# Patient Record
Sex: FEMALE | Race: BLACK OR AFRICAN AMERICAN | NOT HISPANIC OR LATINO
[De-identification: names, ages, dates, MRNs, and addresses within clinical notes are randomized per-mention and may not be internally consistent; named-entity substitution may affect disease eponyms.]

---

## 2019-06-23 PROBLEM — Z00.00 ENCOUNTER FOR PREVENTIVE HEALTH EXAMINATION: Status: ACTIVE | Noted: 2019-06-23

## 2019-07-23 ENCOUNTER — APPOINTMENT (OUTPATIENT)
Dept: NEUROLOGY | Facility: HOSPITAL | Age: 64
End: 2019-07-23

## 2019-07-23 ENCOUNTER — OUTPATIENT (OUTPATIENT)
Dept: OUTPATIENT SERVICES | Facility: HOSPITAL | Age: 64
LOS: 1 days | End: 2019-07-23
Payer: MEDICARE

## 2019-07-23 VITALS
SYSTOLIC BLOOD PRESSURE: 129 MMHG | BODY MASS INDEX: 35.44 KG/M2 | HEART RATE: 91 BPM | DIASTOLIC BLOOD PRESSURE: 87 MMHG | HEIGHT: 63 IN | WEIGHT: 200 LBS

## 2019-07-23 VITALS
DIASTOLIC BLOOD PRESSURE: 76 MMHG | HEART RATE: 66 BPM | HEIGHT: 63 IN | SYSTOLIC BLOOD PRESSURE: 134 MMHG | BODY MASS INDEX: 35.26 KG/M2 | WEIGHT: 199 LBS

## 2019-07-23 DIAGNOSIS — Z86.79 PERSONAL HISTORY OF OTHER DISEASES OF THE CIRCULATORY SYSTEM: ICD-10-CM

## 2019-07-23 DIAGNOSIS — Z86.39 PERSONAL HISTORY OF OTHER ENDOCRINE, NUTRITIONAL AND METABOLIC DISEASE: ICD-10-CM

## 2019-07-23 DIAGNOSIS — I63.9 CEREBRAL INFARCTION, UNSPECIFIED: ICD-10-CM

## 2019-07-23 DIAGNOSIS — R51 HEADACHE: ICD-10-CM

## 2019-07-23 PROCEDURE — G0463: CPT

## 2019-07-23 NOTE — HISTORY OF PRESENT ILLNESS
[FreeTextEntry1] : Pt is a 64 year old lady with a pmhx of stroke 3 years ago, ?takaysu, CAD, HTN and HLD. She is here to establish care due to her stroke in the past. 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 Pt reports on the day of the stroke 3 years ago [place] and was looking at her ipad when she felt like she couldn’t move her body. Pt was treated at [facility] in [place]. Since has been living at the [facility] in [place]. Stroke type, prior imaging and labs unknown at this point. \par \par Home medications: ASA 81mg, amlodipine, atorvastatin 10mg, cyanocobalamin and labetalol. Since her stroke she has not had any further neurological issues. \par \par ROS - denies HA, dizziness, new weakness/numbness, new changes in speech.

## 2019-07-23 NOTE — PHYSICAL EXAM
[FreeTextEntry1] : MS - AAO to person, month and year but not place, speech is fluent, naming and repitition is in tact.\par CNs - EOMI, face is symmetric when asked to smile by assymetric due to poor dentition at rest, sensation in tact b/l\par Motor - Right UE with drift 4/5, 5/5 elsewhere\par Sensory - light touch in tact throughout\par Coordination - FNF abnormal on right due to subtle weakness but normal on the left\par Gait - pt is able to stand unassisted, she walks with a cane/walker. \par

## 2019-07-23 NOTE — DISCUSSION/SUMMARY
[FreeTextEntry1] : Pt is a 64 year old lady with a pmhx of stroke 3 years ago, ?takaysu, CAD, HTN and HLD. She is here to establish care due to her stroke in the past. 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 Her neurological exam is remarkable for some very subtle perseveration however fluent speech for the most part. Also she is 4/5 in her RUE, otherwise 5/5 elsewhere she also has problems walking and balancing. \par \par Plan:\par We will need to obtain prior records regarding her stroke and treatment at time.\par She should continue ASA 81mg daily\par Cardiology referral\par RTC after records obtained and cardiology appointment.

## 2019-10-15 ENCOUNTER — APPOINTMENT (OUTPATIENT)
Dept: NEUROLOGY | Facility: HOSPITAL | Age: 64
End: 2019-10-15

## 2019-10-15 ENCOUNTER — OUTPATIENT (OUTPATIENT)
Dept: OUTPATIENT SERVICES | Facility: HOSPITAL | Age: 64
LOS: 1 days | End: 2019-10-15
Payer: MEDICARE

## 2019-10-15 VITALS
SYSTOLIC BLOOD PRESSURE: 127 MMHG | BODY MASS INDEX: 35.44 KG/M2 | RESPIRATION RATE: 14 BRPM | WEIGHT: 200 LBS | DIASTOLIC BLOOD PRESSURE: 76 MMHG | HEART RATE: 62 BPM | HEIGHT: 63 IN

## 2019-10-15 DIAGNOSIS — I63.9 CEREBRAL INFARCTION, UNSPECIFIED: ICD-10-CM

## 2019-10-15 DIAGNOSIS — R56.9 UNSPECIFIED CONVULSIONS: ICD-10-CM

## 2019-10-15 PROCEDURE — G0463: CPT

## 2019-10-15 NOTE — DISCUSSION/SUMMARY
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 Pt unable to acquire records of stroke from [facility]. Her neurological exam is remarkable for some subtle right sided weakness.\par \par Plan:\par - CBC/CMP/Lipid Panel from 7/2019 is wnl\par - c/w ASA and Lipitor\par - Cardiology referral, TTE w/ bubble study\par - CTA head and neck\par - RTC after testing

## 2019-10-15 NOTE — HISTORY OF PRESENT ILLNESS
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 Pt unable to acquire records of stroke from [facility], does not recall any imaging being done. Pt does not have any new complaints since last visit, still taking medications without change or issue.\par \par Home medications: ASA 81mg, amlodipine, atorvastatin 10mg, cyanocobalamin and labetalol. Since her stroke she has not had any further neurological issues.\par \par ROS - denies HA, dizziness, new weakness/numbness, new changes in speech.

## 2019-10-15 NOTE — PHYSICAL EXAM
[FreeTextEntry1] : MS - AAO to person, month and year but not place, speech is fluent, naming and repitition is in tact.\par CNs - EOMI, face is symmetric when asked to smile by asystemic due to poor dentition at rest, sensation in tact b/l\par Motor - Subtle right sided weakness, 5/5 on the left\par Sensory - light touch in tact throughout\par Coordination - FNF abnormal on right due to subtle weakness but normal on the left\par Gait - pt is able to stand unassisted, she walks with a cane/walker, otherwise uses wheelchair\par

## 2019-11-21 ENCOUNTER — APPOINTMENT (OUTPATIENT)
Dept: CT IMAGING | Facility: CLINIC | Age: 64
End: 2019-11-21
Payer: MEDICARE

## 2019-11-21 ENCOUNTER — OUTPATIENT (OUTPATIENT)
Dept: OUTPATIENT SERVICES | Facility: HOSPITAL | Age: 64
LOS: 1 days | End: 2019-11-21
Payer: MEDICARE

## 2019-11-21 DIAGNOSIS — I63.9 CEREBRAL INFARCTION, UNSPECIFIED: ICD-10-CM

## 2019-11-21 PROCEDURE — 70496 CT ANGIOGRAPHY HEAD: CPT | Mod: 26

## 2019-11-21 PROCEDURE — 70496 CT ANGIOGRAPHY HEAD: CPT

## 2019-11-21 PROCEDURE — 70498 CT ANGIOGRAPHY NECK: CPT

## 2019-11-21 PROCEDURE — 70498 CT ANGIOGRAPHY NECK: CPT | Mod: 26
